# Patient Record
Sex: FEMALE | Race: WHITE | NOT HISPANIC OR LATINO | Employment: OTHER | URBAN - METROPOLITAN AREA
[De-identification: names, ages, dates, MRNs, and addresses within clinical notes are randomized per-mention and may not be internally consistent; named-entity substitution may affect disease eponyms.]

---

## 2017-02-23 ENCOUNTER — HISTORICAL (OUTPATIENT)
Dept: RADIOLOGY | Facility: HOSPITAL | Age: 77
End: 2017-02-23

## 2019-09-27 ENCOUNTER — HISTORICAL (OUTPATIENT)
Dept: RADIOLOGY | Facility: HOSPITAL | Age: 79
End: 2019-09-27

## 2022-09-21 ENCOUNTER — OFFICE VISIT (OUTPATIENT)
Dept: ORTHOPEDICS | Facility: CLINIC | Age: 82
End: 2022-09-21
Payer: MEDICARE

## 2022-09-21 ENCOUNTER — HOSPITAL ENCOUNTER (OUTPATIENT)
Dept: RADIOLOGY | Facility: CLINIC | Age: 82
Discharge: HOME | End: 2022-09-21
Attending: SPECIALIST
Payer: MEDICARE

## 2022-09-21 VITALS
HEART RATE: 75 BPM | SYSTOLIC BLOOD PRESSURE: 150 MMHG | DIASTOLIC BLOOD PRESSURE: 74 MMHG | HEIGHT: 62 IN | BODY MASS INDEX: 27.23 KG/M2 | WEIGHT: 148 LBS

## 2022-09-21 DIAGNOSIS — M25.552 LEFT HIP PAIN: Primary | ICD-10-CM

## 2022-09-21 DIAGNOSIS — M25.552 LEFT HIP PAIN: ICD-10-CM

## 2022-09-21 DIAGNOSIS — M54.16 LUMBAR RADICULOPATHY: ICD-10-CM

## 2022-09-21 DIAGNOSIS — M51.36 DDD (DEGENERATIVE DISC DISEASE), LUMBAR: ICD-10-CM

## 2022-09-21 PROCEDURE — 99203 PR OFFICE/OUTPT VISIT, NEW, LEVL III, 30-44 MIN: ICD-10-PCS | Mod: ,,, | Performed by: SPECIALIST

## 2022-09-21 PROCEDURE — 99203 OFFICE O/P NEW LOW 30 MIN: CPT | Mod: ,,, | Performed by: SPECIALIST

## 2022-09-21 PROCEDURE — 73502 XR HIP WITH PELVIS WHEN PERFORMED, 2 OR 3 VIEWS LEFT: ICD-10-PCS | Mod: LT,,, | Performed by: SPECIALIST

## 2022-09-21 PROCEDURE — 73502 X-RAY EXAM HIP UNI 2-3 VIEWS: CPT | Mod: LT,,, | Performed by: SPECIALIST

## 2022-09-21 RX ORDER — METOPROLOL TARTRATE 25 MG/1
25 TABLET, FILM COATED ORAL 2 TIMES DAILY
COMMUNITY
Start: 2022-07-26

## 2022-09-21 RX ORDER — ASPIRIN 81 MG/1
81 TABLET ORAL
COMMUNITY

## 2022-09-21 RX ORDER — CALCIUM CARBONATE 600 MG
600 TABLET ORAL
COMMUNITY

## 2022-09-21 RX ORDER — METOPROLOL SUCCINATE 25 MG/1
25 TABLET, EXTENDED RELEASE ORAL 2 TIMES DAILY
COMMUNITY
End: 2022-09-21 | Stop reason: ALTCHOICE

## 2022-09-21 RX ORDER — LISINOPRIL 40 MG/1
40 TABLET ORAL DAILY
COMMUNITY
Start: 2022-08-15

## 2022-09-21 RX ORDER — SIMVASTATIN 20 MG/1
20 TABLET, FILM COATED ORAL NIGHTLY
COMMUNITY

## 2022-09-21 RX ORDER — MELOXICAM 7.5 MG/1
7.5 TABLET ORAL DAILY
Qty: 30 TABLET | Refills: 1 | Status: SHIPPED | OUTPATIENT
Start: 2022-09-21

## 2022-09-21 RX ORDER — IBUPROFEN 100 MG/5ML
1000 SUSPENSION, ORAL (FINAL DOSE FORM) ORAL
COMMUNITY

## 2022-09-21 RX ORDER — FAMOTIDINE 20 MG/1
20 TABLET, FILM COATED ORAL 2 TIMES DAILY
COMMUNITY

## 2022-09-21 RX ORDER — HYDROCHLOROTHIAZIDE 12.5 MG/1
12.5 TABLET ORAL DAILY
COMMUNITY

## 2022-09-21 NOTE — PROGRESS NOTES
Chief Complaint:   Chief Complaint   Patient presents with    Left Hip - Pain    Hip Pain     left hip pain, pain started 4 mo ago, pain goes from hip and radiates to buttock and hamstring area, tried ice, has not attended PT, pain is stabbing when moving and aching when sore         History of present illness:    This is a 82 y.o. year old female who complains of left buttock and posterior leg pain  Started 4 months ago and does not recall any injury      History reviewed. No pertinent past medical history.    Past Surgical History:   Procedure Laterality Date    CARDIAC SURGERY      HYSTERECTOMY N/A        Current Outpatient Medications   Medication Instructions    ascorbic acid (vitamin C) (VITAMIN C) 1,000 mg, Oral    aspirin (ECOTRIN) 81 mg, Oral    calcium carbonate (OS-PRIYANKA) 600 mg, Oral    famotidine (PEPCID) 20 mg, Oral, 2 times daily    hydroCHLOROthiazide (HYDRODIURIL) 12.5 mg, Oral, Daily    lisinopriL (PRINIVIL,ZESTRIL) 40 mg, Oral, Daily    metoprolol tartrate (LOPRESSOR) 25 mg, Oral, 2 times daily    multivitamin-iron-folic acid Tab 1 tablet, Oral, Daily    simvastatin (ZOCOR) 20 mg, Oral, Nightly        Review of patient's allergies indicates:   Allergen Reactions    Adhesive      Tape       History reviewed. No pertinent family history.        Review of Systems:    Constitution:   Denies chills, fever, and sweats.  HENT:   Denies headaches or blurry vision.  Cardiovascular:  Denies chest pain or irregular heart beat.  Respiratory:   Denies cough or shortness of breath.  Gastrointestinal:  Denies abdominal pain, nausea, or vomiting.  Musculoskeletal:   Denies muscle cramps.  Neurological:   Denies dizziness or focal weakness.  Psychiatric/Behavior: Normal mental status.  Hematology/Lymph:  Denies bleeding problem or easy bruising/bleeding.  Skin:    Denies rash or suspicious lesions.    Examination:    Vital Signs:    Vitals:    09/21/22 0953 09/21/22 0954   BP: (!) 150/74    Pulse: 75    Weight: 67.1  "kg (148 lb)    Height: 5' 2" (1.575 m)    PainSc:    7       Body mass index is 27.07 kg/m².    Constitution:   Well-developed, well nourished patient in no acute distress.  Neurological:   Alert and oriented x 3 and cooperative to examination.     Psychiatric/Behavior: Normal mental status.  Respiratory:   No shortness of breath.  Eyes:    Extraoccular muscles intact  Skin:    No scars, rash or suspicious lesions.    Musculoskeletal Exam :   Lumbar Exam     No swelling, erythema or increased heat   positive tenderness over spinous process and paravertebral musculature   positive Discomfort with lumbar flexion, extension, lateral rotation and bending   Manual motor testing of the lower extremities is 5 out of 5 bilaterally   DTRs are intact and symmetrical  positive Straight leg raise   No sensory deficits to light touch pedal pulses 2+   Full pain-free range of motion through the right and left hip joint     Left hip and pelvis x-rays today  Mild arthritic changes but well maintained joint spaces    Lower lumbar levels visualized show DDD with scoliosis     Assessment: Left hip pain  -     X-Ray Hip 2 or 3 views Left (with Pelvis when performed); Future; Expected date: 09/21/2022    DDD (degenerative disc disease), lumbar        Plan:  will try a course of NSAIDS and PT    F/u 1 month      DISCLAIMER: This note may have been dictated using voice recognition software and may contain grammatical errors.     NOTE: Consult report sent to referring provider via [facility].    "

## 2022-11-01 ENCOUNTER — OFFICE VISIT (OUTPATIENT)
Dept: ORTHOPEDICS | Facility: CLINIC | Age: 82
End: 2022-11-01
Payer: MEDICARE

## 2022-11-01 VITALS
BODY MASS INDEX: 33.24 KG/M2 | SYSTOLIC BLOOD PRESSURE: 140 MMHG | WEIGHT: 180.63 LBS | HEIGHT: 62 IN | DIASTOLIC BLOOD PRESSURE: 86 MMHG | HEART RATE: 79 BPM

## 2022-11-01 DIAGNOSIS — M51.36 DDD (DEGENERATIVE DISC DISEASE), LUMBAR: Primary | ICD-10-CM

## 2022-11-01 DIAGNOSIS — M54.16 LUMBAR RADICULOPATHY: ICD-10-CM

## 2022-11-01 PROCEDURE — 99213 OFFICE O/P EST LOW 20 MIN: CPT | Mod: ,,, | Performed by: PHYSICIAN ASSISTANT

## 2022-11-01 PROCEDURE — 99213 PR OFFICE/OUTPT VISIT, EST, LEVL III, 20-29 MIN: ICD-10-PCS | Mod: ,,, | Performed by: PHYSICIAN ASSISTANT

## 2022-11-01 NOTE — PROGRESS NOTES
Chief Complaint:   Chief Complaint   Patient presents with    Left Hip - Pain     Left hip pain, pt says she has some pain in left hip, no falls or prior sx, says pain started in 3.5 months ago, no other questions or concerns at this time         History of present illness:    This is a 82 y.o. year old female follow up for her back pain   States her leg pain has improved but still some buttock pain  PT has helped      No past medical history on file.    Past Surgical History:   Procedure Laterality Date    CARDIAC SURGERY      HYSTERECTOMY N/A        Current Outpatient Medications   Medication Instructions    ascorbic acid (vitamin C) (VITAMIN C) 1,000 mg, Oral    aspirin (ECOTRIN) 81 mg, Oral    calcium carbonate (OS-PRIYANKA) 600 mg, Oral    famotidine (PEPCID) 20 mg, Oral, 2 times daily    hydroCHLOROthiazide (HYDRODIURIL) 12.5 mg, Oral, Daily    lisinopriL (PRINIVIL,ZESTRIL) 40 mg, Oral, Daily    meloxicam (MOBIC) 7.5 mg, Oral, Daily, Take with food    metoprolol tartrate (LOPRESSOR) 25 mg, Oral, 2 times daily    multivitamin-iron-folic acid Tab 1 tablet, Oral, Daily    simvastatin (ZOCOR) 20 mg, Oral, Nightly        Review of patient's allergies indicates:   Allergen Reactions    Adhesive      Tape       History reviewed. No pertinent family history.        Review of Systems:    Constitution:   Denies chills, fever, and sweats.  HENT:   Denies headaches or blurry vision.  Cardiovascular:  Denies chest pain or irregular heart beat.  Respiratory:   Denies cough or shortness of breath.  Gastrointestinal:  Denies abdominal pain, nausea, or vomiting.  Musculoskeletal:   Denies muscle cramps.  Neurological:   Denies dizziness or focal weakness.  Psychiatric/Behavior: Normal mental status.  Hematology/Lymph:  Denies bleeding problem or easy bruising/bleeding.  Skin:    Denies rash or suspicious lesions.    Examination:    Vital Signs:    Vitals:    11/01/22 0756   BP: (!) 140/86   Pulse: 79   Weight: 81.9 kg (180 lb 9.6  "oz)   Height: 5' 2" (1.575 m)   PainSc:   5       Body mass index is 33.03 kg/m².    Constitution:   Well-developed, well nourished patient in no acute distress.  Neurological:   Alert and oriented x 3 and cooperative to examination.     Psychiatric/Behavior: Normal mental status.  Respiratory:   No shortness of breath.  Eyes:    Extraoccular muscles intact  Skin:    No scars, rash or suspicious lesions.    Musculoskeletal Exam :   Lumbar Exam     No swelling, erythema or increased heat   positive tenderness over spinous process and paravertebral musculature   positive Discomfort with lumbar flexion, extension, lateral rotation and bending   Manual motor testing of the lower extremities is 5 out of 5 bilaterally   DTRs are intact and symmetrical  positive Straight leg raise   No sensory deficits to light touch pedal pulses 2+   Full pain-free range of motion through the right and left hip joint        Assessment: There are no diagnoses linked to this encounter.      Plan:  will continue with PT and  HEP    F/u prn      DISCLAIMER: This note may have been dictated using voice recognition software and may contain grammatical errors.     NOTE: Consult report sent to referring provider via [facility].    "

## 2023-05-15 ENCOUNTER — PATIENT MESSAGE (OUTPATIENT)
Dept: ADMINISTRATIVE | Facility: OTHER | Age: 83
End: 2023-05-15
Payer: MEDICARE

## 2023-05-16 ENCOUNTER — PATIENT MESSAGE (OUTPATIENT)
Dept: ADMINISTRATIVE | Facility: OTHER | Age: 83
End: 2023-05-16
Payer: MEDICARE

## 2023-05-23 ENCOUNTER — PATIENT MESSAGE (OUTPATIENT)
Dept: RESEARCH | Facility: HOSPITAL | Age: 83
End: 2023-05-23
Payer: MEDICARE